# Patient Record
(demographics unavailable — no encounter records)

---

## 2024-11-14 NOTE — HISTORY OF PRESENT ILLNESS
[None] : ~he/she~ had no significant interval events [de-identified] : 72-year-old man with remote history of IVDA in the past, hepatitis C and abnormal liver enzymes in the past returns for follow up for hepatitis C post treatment with SVR, and cirrhosis.   He completed 12 weeks of Harvoni in August 2015. He achieved sustained virologic response. Although Fibroscan from December 2015 demonstrated F1 disease, he had thrombocytopenia and mild splenomegaly suspicious for cirrhosis.   6/2016 an abdominal CT with contrast reported the liver with cirrhotic morphology, no focal hepatic lesion, upper limit of spleen size. 12/2016 Labs showed undetected HCV RNA, normal alpha-fetoprotein, platelets 130. 000. 12/2016 An abdominal MRI of the liver reported cirrhotic liver with no focal liver mass, Normal spleen and no ascites or MR evidence of portal hypertension.  9/2017 HCV RNA undetected, liver tests were normal. TB 0.9, Cr 1.01, INR 1.08, Na 144, platelets 149,000 normal WBC and Hb. MELD score was 7  9/2017 abdominal US reported cirrhosis but no liver mass., normal size of spleen and no ascites.  3/2018 AST 28, ALT 27, ALP 45, TB 0.9, WBC 7.77, HB 16.3,  K. HCV RNA undetected and AFP normal.  3/2018 abdominal US reported no focal liver mass, heterogenous liver echotexture, normal spleen and no ascites.  3/2018 Fibroscan reported S0 steatosis and F1 fibrosis.   9/2018 WBC 7.9, Hb 15.9, PLT 154K, INR 1.04, Na 144, Cr  1..19, AST 20, ALT 24, ALP 40, TB 0.6,  9/2018 abdominal US reported cirrhosis but no focal liver mass, no ascites.  10/2018 EGD reported no esophageal varices.   3/2019 INR 1.03, Cr 0.94, AST 20, ALT 21, ALP 40, TB 0.6, Na 143, WBC 6.75, Hb 15.4,  K,  MELD-Na was 7  US abd 5/2020 no focal lesion.   Patient lost follow up during pandemic and returned 4/2021, when transferred care.  He had US abd 5/2021 showing cirrhotic morphology liver, no hepatic lesion. He had EGD and colonoscopy, both unremarkable, on 8/11/2021, biopsy from stomach showed no HP. He was seen by urology for small kidney cyst, appreciated, no further workup required. MELD 7. Asymptomatic.  Interim he was also seen for ED in 6/2021 for renal stone that he passed.   He was started on amlodipine for HTN.   Labs and US abd was done 10/5/22.   4/2023 follow up. US abd 4/2023 showing coarsened echotexture liver suggesting cirrhosis and simple renal cysts.  Labs 4/2023 showed normal liver enzymes and function, but dyslipidemia.  He reported feeling well, no new complaints.   10/2023 follow up. No new complaints.  Fibroscan did suggested LSM 8.5 kPa, which is higher than the prior one.  He was sent for MRI that suggested no definite hepatic nodularity, but segment 8 atrophy. Also noted a 6 mm pancreatic cyst.   4/17/24 follow up. No new complaints, feels well. Labs 4/2024 showed normal liver enzymes and function. Noted dyslipidemia and new prediabetes. Reported eating burgers once a week, and pork.   He is here for follow up. Labs, imaging stable, feels well.

## 2024-11-14 NOTE — PHYSICAL EXAM
[General Appearance - Alert] : alert [General Appearance - In No Acute Distress] : in no acute distress [General Appearance - Well Nourished] : well nourished [General Appearance - Well Developed] : well developed [General Appearance - Well-Appearing] : healthy appearing [Sclera] : the sclera and conjunctiva were normal [Oropharynx] : the oropharynx was normal [Neck Appearance] : the appearance of the neck was normal [Respiration, Rhythm And Depth] : normal respiratory rhythm and effort [Exaggerated Use Of Accessory Muscles For Inspiration] : no accessory muscle use [Auscultation Breath Sounds / Voice Sounds] : lungs were clear to auscultation bilaterally [Heart Rate And Rhythm] : heart rate was normal and rhythm regular [Heart Sounds] : normal S1 and S2 [Edema] : there was no peripheral edema [Bowel Sounds] : normal bowel sounds [Abdomen Soft] : soft [Abdomen Tenderness] : non-tender [] : no hepato-splenomegaly [Abdomen Mass (___ Cm)] : no abdominal mass palpated [Abdomen Hernia] : no hernia was discovered [Cervical Lymph Nodes Enlarged Posterior Bilaterally] : posterior cervical [Cervical Lymph Nodes Enlarged Anterior Bilaterally] : anterior cervical [Supraclavicular Lymph Nodes Enlarged Bilaterally] : supraclavicular [Axillary Lymph Nodes Enlarged Bilaterally] : axillary [Femoral Lymph Nodes Enlarged Bilaterally] : femoral [Inguinal Lymph Nodes Enlarged Bilaterally] : inguinal [No CVA Tenderness] : no ~M costovertebral angle tenderness [No Spinal Tenderness] : no spinal tenderness [Abnormal Walk] : normal gait [Skin Color & Pigmentation] : normal skin color and pigmentation [Skin Turgor] : normal skin turgor [Oriented To Time, Place, And Person] : oriented to person, place, and time [Impaired Insight] : insight and judgment were intact [Affect] : the affect was normal [Mood] : the mood was normal [Scleral Icterus] : No Scleral Icterus [Spider Angioma] : No spider angioma(s) were observed [Abdominal  Ascites] : no ascites [Splenomegaly] : no splenomegaly [Liver Palpable ___ Finger Breadths Below Costal Margin] : was not palpable below costal margin [Asterixis] : no asterixis observed [Jaundice] : No jaundice [FreeTextEntry1] : Grossly intact

## 2024-11-14 NOTE — HISTORY OF PRESENT ILLNESS
[None] : ~he/she~ had no significant interval events [de-identified] : 72-year-old man with remote history of IVDA in the past, hepatitis C and abnormal liver enzymes in the past returns for follow up for hepatitis C post treatment with SVR, and cirrhosis.   He completed 12 weeks of Harvoni in August 2015. He achieved sustained virologic response. Although Fibroscan from December 2015 demonstrated F1 disease, he had thrombocytopenia and mild splenomegaly suspicious for cirrhosis.   6/2016 an abdominal CT with contrast reported the liver with cirrhotic morphology, no focal hepatic lesion, upper limit of spleen size. 12/2016 Labs showed undetected HCV RNA, normal alpha-fetoprotein, platelets 130. 000. 12/2016 An abdominal MRI of the liver reported cirrhotic liver with no focal liver mass, Normal spleen and no ascites or MR evidence of portal hypertension.  9/2017 HCV RNA undetected, liver tests were normal. TB 0.9, Cr 1.01, INR 1.08, Na 144, platelets 149,000 normal WBC and Hb. MELD score was 7  9/2017 abdominal US reported cirrhosis but no liver mass., normal size of spleen and no ascites.  3/2018 AST 28, ALT 27, ALP 45, TB 0.9, WBC 7.77, HB 16.3,  K. HCV RNA undetected and AFP normal.  3/2018 abdominal US reported no focal liver mass, heterogenous liver echotexture, normal spleen and no ascites.  3/2018 Fibroscan reported S0 steatosis and F1 fibrosis.   9/2018 WBC 7.9, Hb 15.9, PLT 154K, INR 1.04, Na 144, Cr  1..19, AST 20, ALT 24, ALP 40, TB 0.6,  9/2018 abdominal US reported cirrhosis but no focal liver mass, no ascites.  10/2018 EGD reported no esophageal varices.   3/2019 INR 1.03, Cr 0.94, AST 20, ALT 21, ALP 40, TB 0.6, Na 143, WBC 6.75, Hb 15.4,  K,  MELD-Na was 7  US abd 5/2020 no focal lesion.   Patient lost follow up during pandemic and returned 4/2021, when transferred care.  He had US abd 5/2021 showing cirrhotic morphology liver, no hepatic lesion. He had EGD and colonoscopy, both unremarkable, on 8/11/2021, biopsy from stomach showed no HP. He was seen by urology for small kidney cyst, appreciated, no further workup required. MELD 7. Asymptomatic.  Interim he was also seen for ED in 6/2021 for renal stone that he passed.   He was started on amlodipine for HTN.   Labs and US abd was done 10/5/22.   4/2023 follow up. US abd 4/2023 showing coarsened echotexture liver suggesting cirrhosis and simple renal cysts.  Labs 4/2023 showed normal liver enzymes and function, but dyslipidemia.  He reported feeling well, no new complaints.   10/2023 follow up. No new complaints.  Fibroscan did suggested LSM 8.5 kPa, which is higher than the prior one.  He was sent for MRI that suggested no definite hepatic nodularity, but segment 8 atrophy. Also noted a 6 mm pancreatic cyst.   4/17/24 follow up. No new complaints, feels well. Labs 4/2024 showed normal liver enzymes and function. Noted dyslipidemia and new prediabetes. Reported eating burgers once a week, and pork.   He is here for follow up. Labs, imaging stable, feels well.

## 2024-11-14 NOTE — ASSESSMENT
[FreeTextEntry1] : 72-year-old man with hepatitis C and abnormal liver enzymes in the past returns for follow up for hepatitis C post treatment with SVR, and cirrhosis.  He is s/p DAA for HCV, with SVR and virologic cure and compensated cirrhosis with a low MELD-Na. He has no history of PSE, ascites and no GEV on EGD in 8/2021.   - I have explained to him and educated him at length the natural history of hepatitis C post treatment with SVR, complications of cirrhosis and the risk of HCC. - I have reviewed blood work from 10/2024 and MR/MRCP from 4/2024. Will repeat US abd now and b/o pancreatic cyst, MR/MRCP in 1 yr.  - Fibroscan in the past (6/2021) showed steatosis, and now noted preDM and dyslipidemia. Advised on diet, exercise, lifestyle modifications.  Discussed risk of MASLD and its natural Hx. Repeat Fibroscan w/o fibrosis (2023), will repeat next yr. - I have recommended a low salt diet, maintain 2-3 BMs per day, continue HCC surveillance (next due 6/2024), avoid hepatotoxic agents, and a return follow up in 6 months w/ repeat BW. - Requested to call our office to discuss results interim.  - Advised in complete alcohol abstinence.   RTC 6 months or earlier if change in status

## 2024-11-14 NOTE — REVIEW OF SYSTEMS
[Negative] : Heme/Lymph [Fever] : no fever [Chills] : no chills [Feeling Poorly] : not feeling poorly [Feeling Tired] : not feeling tired [Recent Weight Gain (___ Lbs)] : no recent weight gain [Recent Weight Loss (___ Lbs)] : no recent weight loss [Eye Pain] : no eye pain [Red Eyes] : eyes not red [Chest Pain] : no chest pain [Palpitations] : no palpitations [Lower Ext Edema] : no extremity edema [Shortness Of Breath] : no shortness of breath [Wheezing] : no wheezing [Cough] : no cough [SOB on Exertion] : no shortness of breath during exertion [Abdominal Pain] : no abdominal pain [Vomiting] : no vomiting [Constipation] : no constipation [Diarrhea] : no diarrhea [Heartburn] : no heartburn [Melena] : no melena [Dysuria] : no dysuria [Itching] : no itching [Confused] : no confusion

## 2025-05-14 NOTE — ASSESSMENT
[FreeTextEntry1] : 73-year-old man with hepatitis C and abnormal liver enzymes in the past returns for follow up for hepatitis C post treatment with SVR, and cirrhosis.  He is s/p DAA for HCV, with SVR and virologic cure and compensated cirrhosis with a low MELD-Na. He has no history of PSE, ascites and no GEV on EGD in 8/2021.  Now new CBD dilation to 15 mm on US abd and he is also being monitored for pancreatic cyst.   - I have explained to him and educated him at length the natural history of hepatitis C post treatment with SVR, complications of cirrhosis and the risk of HCC. - I have reviewed blood work from 5/2025 and MR/MRCP from 6/2024 and US abd from 5/2025.  ---- Will repeat MRCP now b/o new CBD dilation and also for the pancreatic cyst f/u. .  - Fibroscan in the past (6/2021) showed steatosis, and now noted preDM and dyslipidemia. Advised on diet, exercise, lifestyle modifications.  Discussed risk of MASLD and its natural Hx. Repeat Fibroscan w/o fibrosis (2023), will repeat this year.  - I have recommended a low salt diet, maintain 2-3 BMs per day, continue HCC surveillance (next due 11/2025 unless abnormal finding on MRCP), avoid hepatotoxic agents, and a return follow up in 6 months w/ repeat BW. - Requested to call our office to discuss results interim.  - Advised on complete alcohol abstinence.   - Ca, vit D and endocrinology referral for OP.   RTC after the MRCP (can be TEB)

## 2025-05-14 NOTE — HISTORY OF PRESENT ILLNESS
[None] : ~he/she~ had no significant interval events [de-identified] : 73-year-old man with remote history of IVDA in the past, hepatitis C and abnormal liver enzymes in the past returns for follow up for hepatitis C post treatment with SVR, and cirrhosis.   He completed 12 weeks of Harvoni in August 2015. He achieved sustained virologic response. Although Fibroscan from December 2015 demonstrated F1 disease, he had thrombocytopenia and mild splenomegaly suspicious for cirrhosis.   6/2016 an abdominal CT with contrast reported the liver with cirrhotic morphology, no focal hepatic lesion, upper limit of spleen size. 12/2016 Labs showed undetected HCV RNA, normal alpha-fetoprotein, platelets 130. 000. 12/2016 An abdominal MRI of the liver reported cirrhotic liver with no focal liver mass, Normal spleen and no ascites or MR evidence of portal hypertension.  9/2017 HCV RNA undetected, liver tests were normal. TB 0.9, Cr 1.01, INR 1.08, Na 144, platelets 149,000 normal WBC and Hb. MELD score was 7  9/2017 abdominal US reported cirrhosis but no liver mass., normal size of spleen and no ascites.  3/2018 AST 28, ALT 27, ALP 45, TB 0.9, WBC 7.77, HB 16.3,  K. HCV RNA undetected and AFP normal.  3/2018 abdominal US reported no focal liver mass, heterogenous liver echotexture, normal spleen and no ascites.  3/2018 Fibroscan reported S0 steatosis and F1 fibrosis.   9/2018 WBC 7.9, Hb 15.9, PLT 154K, INR 1.04, Na 144, Cr  1..19, AST 20, ALT 24, ALP 40, TB 0.6,  9/2018 abdominal US reported cirrhosis but no focal liver mass, no ascites.  10/2018 EGD reported no esophageal varices.   3/2019 INR 1.03, Cr 0.94, AST 20, ALT 21, ALP 40, TB 0.6, Na 143, WBC 6.75, Hb 15.4,  K,  MELD-Na was 7  US abd 5/2020 no focal lesion.   Patient lost follow up during pandemic and returned 4/2021, when transferred care.  He had US abd 5/2021 showing cirrhotic morphology liver, no hepatic lesion. He had EGD and colonoscopy, both unremarkable, on 8/11/2021, biopsy from stomach showed no HP. He was seen by urology for small kidney cyst, appreciated, no further workup required. MELD 7. Asymptomatic.  Interim he was also seen for ED in 6/2021 for renal stone that he passed.   He was started on amlodipine for HTN.   Labs and US abd was done 10/5/22.   4/2023 follow up. US abd 4/2023 showing coarsened echotexture liver suggesting cirrhosis and simple renal cysts.  Labs 4/2023 showed normal liver enzymes and function, but dyslipidemia.  He reported feeling well, no new complaints.   10/2023 follow up. No new complaints.  Fibroscan did suggested LSM 8.5 kPa, which is higher than the prior one.  He was sent for MRI that suggested no definite hepatic nodularity, but segment 8 atrophy. Also noted a 6 mm pancreatic cyst.   4/17/24 follow up. No new complaints, feels well. Labs 4/2024 showed normal liver enzymes and function. Noted dyslipidemia and new prediabetes. Reported eating burgers once a week, and pork.   11/2024 follow up. Labs, imaging stable, feels well.   He is here for follow up. No new complaints. Liver enzymes, even cholestatic are normal. However, US abd reported CBD being 15 mm (was 4 mm in past), thus has pending MRCP. He was also found to have OP, taking Ca/vit D.

## 2025-07-30 NOTE — HISTORY OF PRESENT ILLNESS
[FreeTextEntry1] : HPI: 73 year old male presenting for Osteoporosis diagnosed on DEXA scan on April 2025.    PMHx: HTN   Allergies: NKDA   ENDOCRINOPATHY Hx: 2 years ago, had kidney stone but none prior or after.  exercises regularly - retired and trying to stay busy takes vitamin D regularly daily  taking calcium mag zinc tablet  taking centrum for men daily  States has history of falling on the knee and twisting of the back but denied any fractures. Went to therapy  Denies history of pain medications Denies history of hypogonadism and low testosterone.  Has history of hep C    Current pertinent lifestyle factors include no past or present history of disordered eating, no past or present history of taking steroids, no past or present history of a sedentary lifestyle, not currently smoking, no fall risk, no use of a walker/cane, no hyperparathyroidism, regular dental follow-up, no history of radiation therapy and no history of blood clots no family history of osteoporosis, family history of breast cancer and no family history of hip fracture. no previous fragility fracture.

## 2025-07-30 NOTE — ASSESSMENT
[Bisphosphonate Therapy] : Risks and benefits of bisphosphonate therapy were  discussed with the patient including gastroesophageal irritation, osteonecrosis of the jaw, and atypical femur fractures, and acute phase reaction [FreeTextEntry1] : Osteoporosis: Femoral Neck: T-score: -2.5 Total Hip: T-score: -1.4 Spine:  T-score: -2.9 Takes Vitamin D and Calcium Not noted hypercalcemia Hx of Kidney stone Will get additional lab work to assess for additional risk factors, hyperparathyroidism, hypogonadism  Discussed possibly starting Bisphosphonate therapy after resulting of labs, pt acknowledges plan